# Patient Record
Sex: MALE | Race: WHITE
[De-identification: names, ages, dates, MRNs, and addresses within clinical notes are randomized per-mention and may not be internally consistent; named-entity substitution may affect disease eponyms.]

---

## 2019-12-05 PROBLEM — Z00.00 ENCOUNTER FOR PREVENTIVE HEALTH EXAMINATION: Status: ACTIVE | Noted: 2019-12-05

## 2020-01-02 ENCOUNTER — APPOINTMENT (OUTPATIENT)
Dept: ENDOCRINOLOGY | Facility: CLINIC | Age: 20
End: 2020-01-02
Payer: COMMERCIAL

## 2020-01-02 ENCOUNTER — RX CHANGE (OUTPATIENT)
Age: 20
End: 2020-01-02

## 2020-01-02 VITALS
OXYGEN SATURATION: 97 % | WEIGHT: 168 LBS | DIASTOLIC BLOOD PRESSURE: 80 MMHG | HEIGHT: 66.25 IN | HEART RATE: 82 BPM | BODY MASS INDEX: 27 KG/M2 | SYSTOLIC BLOOD PRESSURE: 106 MMHG

## 2020-01-02 DIAGNOSIS — Z86.39 PERSONAL HISTORY OF OTHER ENDOCRINE, NUTRITIONAL AND METABOLIC DISEASE: ICD-10-CM

## 2020-01-02 DIAGNOSIS — Z82.49 FAMILY HISTORY OF ISCHEMIC HEART DISEASE AND OTHER DISEASES OF THE CIRCULATORY SYSTEM: ICD-10-CM

## 2020-01-02 DIAGNOSIS — Z78.9 OTHER SPECIFIED HEALTH STATUS: ICD-10-CM

## 2020-01-02 DIAGNOSIS — F17.200 NICOTINE DEPENDENCE, UNSPECIFIED, UNCOMPLICATED: ICD-10-CM

## 2020-01-02 LAB — GLUCOSE BLDC GLUCOMTR-MCNC: 117

## 2020-01-02 PROCEDURE — 82962 GLUCOSE BLOOD TEST: CPT

## 2020-01-02 PROCEDURE — 95251 CONT GLUC MNTR ANALYSIS I&R: CPT | Mod: 59

## 2020-01-02 PROCEDURE — 99205 OFFICE O/P NEW HI 60 MIN: CPT | Mod: 25

## 2020-01-02 RX ORDER — GLUCAGON 1 MG
1 KIT INJECTION
Qty: 2 | Refills: 2 | Status: ACTIVE | COMMUNITY
Start: 2020-01-02 | End: 1900-01-01

## 2020-01-02 RX ORDER — INSULIN GLARGINE 100 [IU]/ML
100 INJECTION, SOLUTION SUBCUTANEOUS
Qty: 6 | Refills: 0 | Status: DISCONTINUED | COMMUNITY
Start: 1900-01-01 | End: 2020-01-02

## 2020-01-02 NOTE — ASSESSMENT
[Hypoglycemia Management] : hypoglycemia management [Glucagon Use] : glucagon use [Ketone Testing] : ketone testing [Sick-Day Management] : sick-day management [Long Term Vascular Complications] : long term vascular complications of diabetes [Action and use of Insulin] : action and use of short and long-acting insulin [Importance of Diet and Exercise] : importance of diet and exercise to improve glycemic control, achieve weight loss and improve cardiovascular health [Self Monitoring of Blood Glucose] : self monitoring of blood glucose

## 2020-01-02 NOTE — PROCEDURE
[FreeTextEntry1] : CGM data worth   14    days reviewed \par dated from Dec 20 to Jan 2nd \par s/o \par Above 250 mg % 32 %\par in target range- 32   ( target 61.7> 70 % ) \par Coefficient of variation   -            ( < 33 %) \par severe hypoglycemia < 54 %  -10 % \par reviewed and scanned in the system \par Adv \par discussed with the patient \par

## 2020-01-02 NOTE — HISTORY OF PRESENT ILLNESS
[FreeTextEntry1] : Mr. GEETHA OJEDA is 19 year male .\par GEETHA  was diagnosed of diabetes type 1-- 10   years back. Currently he is on  . \par lantus 9-10 units at bedtime, novolog flexpen 5-6 units before meals, 1-15 carb ratio and 1:80 correction over 180 mg % \par he is tolerating these medications well-none \par Glycemia control has been  suboptimal \par Last A1c was 3 mths back and was 8 % \par he complains of\par polyuria -no \par polydipsia -no \par nocturia -no \par neuropathic symptoms -no \par no retinopathy per him -exam in early 2019 \par  Hypoglycemia frequency is 3/ week , mange by himself - \par once needed help from EMS \par no nocturnal hypoglycemia \par never been on a pump \par would like to try a pump \par last Dka episode was - 2 yrs ago on his birthday \par did not like to check BG \par uses freestyle daquan and loves it -more compliant now

## 2020-01-03 LAB
ALBUMIN SERPL ELPH-MCNC: 4.5 G/DL
ALP BLD-CCNC: 129 U/L
ALT SERPL-CCNC: 18 U/L
ANION GAP SERPL CALC-SCNC: 15 MMOL/L
AST SERPL-CCNC: 18 U/L
BILIRUB SERPL-MCNC: 0.3 MG/DL
BUN SERPL-MCNC: 20 MG/DL
CALCIUM SERPL-MCNC: 10.1 MG/DL
CHLORIDE SERPL-SCNC: 101 MMOL/L
CHOLEST SERPL-MCNC: 183 MG/DL
CHOLEST/HDLC SERPL: 2.7 RATIO
CO2 SERPL-SCNC: 24 MMOL/L
CREAT SERPL-MCNC: 0.86 MG/DL
CREAT SPEC-SCNC: 195 MG/DL
GLUCOSE SERPL-MCNC: 153 MG/DL
HCT VFR BLD CALC: 50.3 %
HDLC SERPL-MCNC: 67 MG/DL
HGB BLD-MCNC: 16.5 G/DL
LDLC SERPL CALC-MCNC: 95 MG/DL
MICROALBUMIN 24H UR DL<=1MG/L-MCNC: <1.2 MG/DL
MICROALBUMIN/CREAT 24H UR-RTO: NORMAL MG/G
POTASSIUM SERPL-SCNC: 5.1 MMOL/L
PROT SERPL-MCNC: 6.8 G/DL
SODIUM SERPL-SCNC: 140 MMOL/L
TRIGL SERPL-MCNC: 103 MG/DL
TSH SERPL-ACNC: 1.13 UIU/ML

## 2020-01-16 ENCOUNTER — TRANSCRIPTION ENCOUNTER (OUTPATIENT)
Age: 20
End: 2020-01-16

## 2020-01-23 ENCOUNTER — APPOINTMENT (OUTPATIENT)
Dept: ENDOCRINOLOGY | Facility: CLINIC | Age: 20
End: 2020-01-23
Payer: COMMERCIAL

## 2020-01-23 VITALS
DIASTOLIC BLOOD PRESSURE: 70 MMHG | WEIGHT: 165 LBS | BODY MASS INDEX: 26.52 KG/M2 | OXYGEN SATURATION: 97 % | HEIGHT: 66.25 IN | SYSTOLIC BLOOD PRESSURE: 118 MMHG | HEART RATE: 90 BPM

## 2020-01-23 LAB — GLUCOSE BLDC GLUCOMTR-MCNC: 330

## 2020-01-23 PROCEDURE — 82962 GLUCOSE BLOOD TEST: CPT

## 2020-01-23 PROCEDURE — 99215 OFFICE O/P EST HI 40 MIN: CPT | Mod: 25

## 2020-01-23 NOTE — HISTORY OF PRESENT ILLNESS
[FreeTextEntry1] : Mr. GEETHA OJEDA is 19 year male .\par GEETHA  was diagnosed of diabetes type 1-- 10   years back. Currently he is on  . \par lantus 9-10 units at bedtime, novolog flexpen 5-6 units before meals, 1-15 carb ratio and 1:80 correction over 180 mg % \par he is tolerating these medications well-none \par Glycemia control has been  suboptimal \par Last A1c was 3 mths back and was 8 % \par he complains of\par polyuria -no \par polydipsia -no \par nocturia -no \par neuropathic symptoms -no \par no retinopathy per him -exam in early 2019 \par  Hypoglycemia frequency is 3/ week , mange by himself - \par once needed help from EMS \par no nocturnal hypoglycemia \par never been on a pump \par would like to try a pump \par last Dka episode was - 2 yrs ago on his birthday \par did not like to check BG \par uses freestyle daquan and loves it -more compliant now \par \par \par 01/23/2020- FOLLOW UP\par doing ok \par still has glycemic variability but lot of unexplained lows in late afternoon after work \par CGM down load suggests no checking in early part of the day \par limited data to interprat \par no dose change in insulin from last time \par no DKA episodes \par lantus 9-10 units at bedtime, novolog flexpen 5-6 units before meals, 1-15 carb ratio and 1:80 correction over 180 mg % \par Review of system \par no chest pain, no palpitations \par no Shortness of breath,no wheezing. \par \par

## 2020-02-27 ENCOUNTER — RESULT CHARGE (OUTPATIENT)
Age: 20
End: 2020-02-27

## 2020-02-27 ENCOUNTER — APPOINTMENT (OUTPATIENT)
Dept: ENDOCRINOLOGY | Facility: CLINIC | Age: 20
End: 2020-02-27
Payer: COMMERCIAL

## 2020-02-27 VITALS
DIASTOLIC BLOOD PRESSURE: 70 MMHG | SYSTOLIC BLOOD PRESSURE: 110 MMHG | BODY MASS INDEX: 26.68 KG/M2 | HEIGHT: 66.25 IN | WEIGHT: 166 LBS | OXYGEN SATURATION: 96 % | HEART RATE: 66 BPM

## 2020-02-27 LAB — GLUCOSE BLDC GLUCOMTR-MCNC: 232

## 2020-02-27 PROCEDURE — 99214 OFFICE O/P EST MOD 30 MIN: CPT | Mod: 25

## 2020-02-27 PROCEDURE — 95251 CONT GLUC MNTR ANALYSIS I&R: CPT | Mod: 59

## 2020-02-27 NOTE — PROCEDURE
[FreeTextEntry1] : CGM data worth   14    days reviewed \par dated from feb 9th to feb 22 2020\par s/o hyperglyemia \par in target range- 32 %   ( target > 70 % ) \par Coefficient of variation   -     52.1       ( < 33 %) \par severe hypoglycemia - 3%\par reviewed and scanned in the system \par Adv \par discussed with the patient\par

## 2020-02-27 NOTE — HISTORY OF PRESENT ILLNESS
[FreeTextEntry1] : Mr. GEETHA OJEDA is 19 year male .\par GEETHA  was diagnosed of diabetes type 1-- 10   years back. Currently he is on  . \par lantus 9-10 units at bedtime, novolog flexpen 5-6 units before meals, 1-15 carb ratio and 1:80 correction over 180 mg % \par he is tolerating these medications well-none \par Glycemia control has been  suboptimal \par Last A1c was 3 mths back and was 8 % \par he complains of\par polyuria -no \par polydipsia -no \par nocturia -no \par neuropathic symptoms -no \par no retinopathy per him -exam in early 2019 \par  Hypoglycemia frequency is 3/ week , mange by himself - \par once needed help from EMS \par no nocturnal hypoglycemia \par never been on a pump \par would like to try a pump \par last Dka episode was - 2 yrs ago on his birthday \par did not like to check BG \par uses freestyle daquan and loves it -more compliant now \par \par \par 01/23/2020- FOLLOW UP\par doing ok \par still has glycemic variability but lot of unexplained lows in late afternoon after work \par CGM down load suggests no checking in early part of the day \par limited data to interprat \par no dose change in insulin from last time \par no DKA episodes \par lantus 9-10 units at bedtime, novolog flexpen 5-6 units before meals, 1-15 carb ratio and 1:80 correction over 180 mg % \par Review of system \par no chest pain, no palpitations \par no Shortness of breath,no wheezing. 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 \par

## 2020-04-09 ENCOUNTER — APPOINTMENT (OUTPATIENT)
Dept: ENDOCRINOLOGY | Facility: CLINIC | Age: 20
End: 2020-04-09

## 2020-04-09 ENCOUNTER — APPOINTMENT (OUTPATIENT)
Dept: ENDOCRINOLOGY | Facility: CLINIC | Age: 20
End: 2020-04-09
Payer: COMMERCIAL

## 2020-04-09 PROCEDURE — 99214 OFFICE O/P EST MOD 30 MIN: CPT | Mod: 95

## 2020-04-09 NOTE — HISTORY OF PRESENT ILLNESS
[Home] : at home, [unfilled] , at the time of the visit. [Medical Office: ([facility])___] : at ~his/her~ medical office located in [place] [FreeTextEntry1] : 04/09/2020 \par \par This is a telemedicine  encounter which was initiated by the patient during a time scheduled for a visit and the patient is aware that this may be a billable encounter.  Verbal consent was discussed and obtained from the patient for this visit:  "You have chosen to receive care through the use of televideo .   This enables health care providers at different locations to provide safe, effective, and convenient care through the use of technology.  Please note this is a billable encounter.  As with any health care service, there are risks associated with the use of tele-media or telephone, including equipment failure, poor image and/or resolution, and  issues.  You understand that I cannot physically examine you and that you may need to come to the office to complete the assessment.\par \par Patient agreed verbally to understanding the risks, benefits, alternatives of Tele-Media and telephone as explained.  All questions regarding tele-media and telephone encounters were answered. '\par \par \par \par \par \par \par \par Mr. GEETHA OJEDA is 19 year male .\par GEETHA  was diagnosed of diabetes type 1-- 10   years back. Currently he is on  . \par lantus 9-10 units at bedtime, novolog flexpen 5-6 units before meals, 1-15 carb ratio and 1:80 correction over 180 mg % \par he is tolerating these medications well-none \par Glycemia control has been  suboptimal \par Last A1c was 3 mths back and was 8 % \par he complains of\par polyuria -no \par polydipsia -no \par nocturia -no \par neuropathic symptoms -no \par no retinopathy per him -exam in early 2019 \par  Hypoglycemia frequency is 3/ week , mange by himself - \par once needed help from EMS \par no nocturnal hypoglycemia \par never been on a pump \par would like to try a pump \par last Dka episode was - 2 yrs ago on his birthday \par did not like to check BG \par uses freestyle daquan and loves it -more compliant now \par \par \par 01/23/2020- FOLLOW UP\par doing ok \par still has glycemic variability but lot of unexplained lows in late afternoon after work \par CGM down load suggests no checking in early part of the day \par limited data to interprat \par no dose change in insulin from last time \par no DKA episodes \par lantus 9-10 units at bedtime, novolog flexpen 5-6 units before meals, 1-15 carb ratio and 1:80 correction over 180 mg % \par Review of system \par no chest pain, no palpitations \par no Shortness of breath,no wheezing. 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 \par \par \par 04/09/2020- FOLLOW UP\par doing well \par BG have been better per him \par not working now, eating lot more at home \par frequency of hypoglycemia has decreased \par avg BG per him has been in 200 mg % \par on glargine 15 units \par and correction of 1:15 and !:80 \par has glucagon and suppies at home \par Review of system \par no chest pain, no palpitations \par no Shortness of breath,no wheezing. \par \par \par \par

## 2020-09-14 ENCOUNTER — APPOINTMENT (OUTPATIENT)
Dept: ENDOCRINOLOGY | Facility: CLINIC | Age: 20
End: 2020-09-14
Payer: COMMERCIAL

## 2020-09-14 PROCEDURE — 99214 OFFICE O/P EST MOD 30 MIN: CPT | Mod: 95

## 2020-09-14 RX ORDER — PEN NEEDLE, DIABETIC 29 G X1/2"
31G X 5 MM NEEDLE, DISPOSABLE MISCELLANEOUS
Qty: 1 | Refills: 1 | Status: ACTIVE | COMMUNITY
Start: 2020-09-14 | End: 1900-01-01

## 2020-09-14 RX ORDER — FLASH GLUCOSE SENSOR
KIT MISCELLANEOUS
Qty: 6 | Refills: 1 | Status: ACTIVE | COMMUNITY
Start: 1900-01-01 | End: 1900-01-01

## 2020-09-14 NOTE — HISTORY OF PRESENT ILLNESS
[Home] : at home, [unfilled] , at the time of the visit. [Verbal consent obtained from patient] : the patient, [unfilled] [Other Location: e.g. Home (Enter Location, City,State)___] : at [unfilled] [FreeTextEntry1] : 04/09/2020 \par \par This is a telemedicine  encounter which was initiated by the patient during a time scheduled for a visit and the patient is aware that this may be a billable encounter.  Verbal consent was discussed and obtained from the patient for this visit:  "You have chosen to receive care through the use of televideo .   This enables health care providers at different locations to provide safe, effective, and convenient care through the use of technology.  Please note this is a billable encounter.  As with any health care service, there are risks associated with the use of tele-media or telephone, including equipment failure, poor image and/or resolution, and  issues.  You understand that I cannot physically examine you and that you may need to come to the office to complete the assessment.\par \par Patient agreed verbally to understanding the risks, benefits, alternatives of Tele-Media and telephone as explained.  All questions regarding tele-media and telephone encounters were answered. '\par \par \par \par \par \par \par \par Mr. GEETHA OJEDA is 19 year male .\par GEETHA  was diagnosed of diabetes type 1-- 10   years back. Currently he is on  . \par lantus 9-10 units at bedtime, novolog flexpen 5-6 units before meals, 1-15 carb ratio and 1:80 correction over 180 mg % \par he is tolerating these medications well-none \par Glycemia control has been  suboptimal \par Last A1c was 3 mths back and was 8 % \par he complains of\par polyuria -no \par polydipsia -no \par nocturia -no \par neuropathic symptoms -no \par no retinopathy per him -exam in early 2019 \par  Hypoglycemia frequency is 3/ week , mange by himself - \par once needed help from EMS \par no nocturnal hypoglycemia \par never been on a pump \par would like to try a pump \par last Dka episode was - 2 yrs ago on his birthday \par did not like to check BG \par uses freestyle daquan and loves it -more compliant now \par \par \par 01/23/2020- FOLLOW UP\par doing ok \par still has glycemic variability but lot of unexplained lows in late afternoon after work \par CGM down load suggests no checking in early part of the day \par limited data to interprat \par no dose change in insulin from last time \par no DKA episodes \par lantus 9-10 units at bedtime, novolog flexpen 5-6 units before meals, 1-15 carb ratio and 1:80 correction over 180 mg % \par Review of system \par no chest pain, no palpitations \par no Shortness of breath,no wheezing. 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 \par \par \par 04/09/2020- FOLLOW UP\par doing well \par BG have been better per him \par not working now, eating lot more at home \par frequency of hypoglycemia has decreased \par avg BG per him has been in 200 mg % \par on glargine 15 units \par and correction of 1:15 and !:80 \par has glucagon and suppies at home \par Review of system \par no chest pain, no palpitations \par no Shortness of breath,no wheezing. \par \par \par 09/14/2020- FOLLOW UP\par \par out of daquan supplies \par A1c is due \par doing well \par needs new glucagon pen \par frequency of hypoglycemia has decreased \par avg BG per him has been in 200 mg % \par on glargine 15 units \par and correction of 1:14 and 1:80 \par Review of system \par no chest pain, no palpitations \par no Shortness of breath,no wheezing. \par \par

## 2020-09-22 ENCOUNTER — LABORATORY RESULT (OUTPATIENT)
Age: 20
End: 2020-09-22

## 2020-09-30 ENCOUNTER — APPOINTMENT (OUTPATIENT)
Dept: ENDOCRINOLOGY | Facility: CLINIC | Age: 20
End: 2020-09-30

## 2021-07-08 ENCOUNTER — APPOINTMENT (OUTPATIENT)
Dept: ENDOCRINOLOGY | Facility: CLINIC | Age: 21
End: 2021-07-08
Payer: COMMERCIAL

## 2021-07-08 VITALS
HEART RATE: 90 BPM | SYSTOLIC BLOOD PRESSURE: 100 MMHG | HEIGHT: 66.25 IN | OXYGEN SATURATION: 98 % | BODY MASS INDEX: 26.36 KG/M2 | WEIGHT: 164 LBS | DIASTOLIC BLOOD PRESSURE: 60 MMHG

## 2021-07-08 DIAGNOSIS — Z91.89 OTHER SPECIFIED PERSONAL RISK FACTORS, NOT ELSEWHERE CLASSIFIED: ICD-10-CM

## 2021-07-08 DIAGNOSIS — E10.65 TYPE 1 DIABETES MELLITUS WITH HYPERGLYCEMIA: ICD-10-CM

## 2021-07-08 LAB
GLUCOSE BLDC GLUCOMTR-MCNC: 60
GLUCOSE BLDC GLUCOMTR-MCNC: 99
HBA1C MFR BLD HPLC: 9.7

## 2021-07-08 PROCEDURE — 82962 GLUCOSE BLOOD TEST: CPT

## 2021-07-08 PROCEDURE — 99072 ADDL SUPL MATRL&STAF TM PHE: CPT

## 2021-07-08 PROCEDURE — 99215 OFFICE O/P EST HI 40 MIN: CPT | Mod: 25

## 2021-07-08 PROCEDURE — 83036 HEMOGLOBIN GLYCOSYLATED A1C: CPT | Mod: QW

## 2021-07-08 RX ORDER — GLUCAGON 1 MG
1 KIT INJECTION
Qty: 2 | Refills: 2 | Status: ACTIVE | COMMUNITY
Start: 2020-09-14 | End: 1900-01-01

## 2021-07-22 PROBLEM — Z91.89 AT RISK FOR HYPOGLYCEMIA: Status: ACTIVE | Noted: 2020-01-02

## 2021-07-22 PROBLEM — E10.65 UNCONTROLLED TYPE 1 DIABETES MELLITUS WITH HYPERGLYCEMIA: Status: ACTIVE | Noted: 2020-01-02

## 2021-07-22 NOTE — HISTORY OF PRESENT ILLNESS
[FreeTextEntry1] : 04/09/2020 \par \par This is a telemedicine  encounter which was initiated by the patient during a time scheduled for a visit and the patient is aware that this may be a billable encounter.  Verbal consent was discussed and obtained from the patient for this visit:  "You have chosen to receive care through the use of televideo .   This enables health care providers at different locations to provide safe, effective, and convenient care through the use of technology.  Please note this is a billable encounter.  As with any health care service, there are risks associated with the use of tele-media or telephone, including equipment failure, poor image and/or resolution, and  issues.  You understand that I cannot physically examine you and that you may need to come to the office to complete the assessment.\par \par Patient agreed verbally to understanding the risks, benefits, alternatives of Tele-Media and telephone as explained.  All questions regarding tele-media and telephone encounters were answered. '\par \par \par \par \par \par \par \par Mr. GEETHA OJEDA is 19 year male .\par GEETHA  was diagnosed of diabetes type 1-- 10   years back. Currently he is on  . \par lantus 9-10 units at bedtime, novolog flexpen 5-6 units before meals, 1-15 carb ratio and 1:80 correction over 180 mg % \par he is tolerating these medications well-none \par Glycemia control has been  suboptimal \par Last A1c was 3 mths back and was 8 % \par he complains of\par polyuria -no \par polydipsia -no \par nocturia -no \par neuropathic symptoms -no \par no retinopathy per him -exam in early 2019 \par  Hypoglycemia frequency is 3/ week , mange by himself - \par once needed help from EMS \par no nocturnal hypoglycemia \par never been on a pump \par would like to try a pump \par last Dka episode was - 2 yrs ago on his birthday \par did not like to check BG \par uses freestyle daquan and loves it -more compliant now \par \par \par 01/23/2020- FOLLOW UP\par doing ok \par still has glycemic variability but lot of unexplained lows in late afternoon after work \par CGM down load suggests no checking in early part of the day \par limited data to interprat \par no dose change in insulin from last time \par no DKA episodes \par lantus 9-10 units at bedtime, novolog flexpen 5-6 units before meals, 1-15 carb ratio and 1:80 correction over 180 mg % \par Review of system \par no chest pain, no palpitations \par no Shortness of breath,no wheezing. 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 \par \par \par 04/09/2020- FOLLOW UP\par doing well \par BG have been better per him \par not working now, eating lot more at home \par frequency of hypoglycemia has decreased \par avg BG per him has been in 200 mg % \par on glargine 15 units \par and correction of 1:15 and !:80 \par has glucagon and suppies at home \par Review of system \par no chest pain, no palpitations \par no Shortness of breath,no wheezing. \par \par \par 09/14/2020- FOLLOW UP\par \par out of daquan supplies \par A1c is due \par doing well \par needs new glucagon pen \par frequency of hypoglycemia has decreased \par avg BG per him has been in 200 mg % \par on glargine 15 units \par and correction of 1:14 and 1:80 \par Review of system \par no chest pain, no palpitations \par no Shortness of breath,no wheezing. \par \par \par 07/08/2021- FOLLOW UP\par Presents for follow-up today he is on Lantus 15 units at night and NovoLog 8 to 15 units before meals.  Patient has not been using a CGM.  We discussed the treatment for hypoglycemia today.  He is A1c is 9.7%.  He wants to go on an insulin pump as well as continuous glucose monitor at the earliest.  His last eye exam was earlier this year which was negative for any concerning retinopathy.  His blood sugar was 60 mg percent in the office he was provided correction for leaving the office.\par

## 2021-07-26 LAB
ALBUMIN SERPL ELPH-MCNC: 4.6 G/DL
ALBUMIN SERPL ELPH-MCNC: 4.6 G/DL
ALP BLD-CCNC: 150 U/L
ALT SERPL-CCNC: 31 U/L
ANION GAP SERPL CALC-SCNC: 22 MMOL/L
AST SERPL-CCNC: 25 U/L
BILIRUB DIRECT SERPL-MCNC: 0.1 MG/DL
BILIRUB INDIRECT SERPL-MCNC: 0.3 MG/DL
BILIRUB SERPL-MCNC: 0.4 MG/DL
BUN SERPL-MCNC: 25 MG/DL
C PEPTIDE SERPL-MCNC: <0.1 NG/ML
CALCIUM SERPL-MCNC: 11.2 MG/DL
CHLORIDE SERPL-SCNC: 100 MMOL/L
CHOLEST SERPL-MCNC: 217 MG/DL
CO2 SERPL-SCNC: 16 MMOL/L
CREAT SERPL-MCNC: 0.69 MG/DL
CREAT SPEC-SCNC: 75 MG/DL
ESTIMATED AVERAGE GLUCOSE: 229 MG/DL
GLUCOSE SERPL-MCNC: 183 MG/DL
HBA1C MFR BLD HPLC: 9.6 %
HDLC SERPL-MCNC: 72 MG/DL
LDLC SERPL CALC-MCNC: 115 MG/DL
MICROALBUMIN 24H UR DL<=1MG/L-MCNC: <1.2 MG/DL
MICROALBUMIN/CREAT 24H UR-RTO: NORMAL MG/G
NONHDLC SERPL-MCNC: 145 MG/DL
PHOSPHATE SERPL-MCNC: 3.5 MG/DL
POTASSIUM SERPL-SCNC: 4.6 MMOL/L
PROT SERPL-MCNC: 6.7 G/DL
SODIUM SERPL-SCNC: 137 MMOL/L
TRIGL SERPL-MCNC: 148 MG/DL
TSH SERPL-ACNC: 0.71 UIU/ML

## 2021-10-05 ENCOUNTER — APPOINTMENT (OUTPATIENT)
Dept: ENDOCRINOLOGY | Facility: CLINIC | Age: 21
End: 2021-10-05

## 2022-01-22 RX ORDER — INSULIN GLARGINE 100 [IU]/ML
100 INJECTION, SOLUTION SUBCUTANEOUS DAILY
Qty: 2 | Refills: 0 | Status: ACTIVE | COMMUNITY
Start: 2020-01-02 | End: 1900-01-01

## 2022-01-22 RX ORDER — INSULIN ASPART 100 [IU]/ML
100 INJECTION, SOLUTION INTRAVENOUS; SUBCUTANEOUS
Qty: 2 | Refills: 0 | Status: ACTIVE | COMMUNITY
Start: 1900-01-01 | End: 1900-01-01

## 2022-10-31 ENCOUNTER — RX RENEWAL (OUTPATIENT)
Age: 22
End: 2022-10-31